# Patient Record
Sex: FEMALE | Race: WHITE | NOT HISPANIC OR LATINO
[De-identification: names, ages, dates, MRNs, and addresses within clinical notes are randomized per-mention and may not be internally consistent; named-entity substitution may affect disease eponyms.]

---

## 2018-04-04 PROBLEM — Z00.00 ENCOUNTER FOR PREVENTIVE HEALTH EXAMINATION: Status: ACTIVE | Noted: 2018-04-04

## 2018-04-09 ENCOUNTER — APPOINTMENT (OUTPATIENT)
Dept: VASCULAR SURGERY | Facility: CLINIC | Age: 78
End: 2018-04-09
Payer: MEDICARE

## 2018-04-09 VITALS
SYSTOLIC BLOOD PRESSURE: 183 MMHG | OXYGEN SATURATION: 96 % | DIASTOLIC BLOOD PRESSURE: 100 MMHG | HEIGHT: 66 IN | WEIGHT: 150 LBS | BODY MASS INDEX: 24.11 KG/M2 | HEART RATE: 77 BPM

## 2018-04-09 DIAGNOSIS — Z78.9 OTHER SPECIFIED HEALTH STATUS: ICD-10-CM

## 2018-04-09 DIAGNOSIS — I77.9 DISORDER OF ARTERIES AND ARTERIOLES, UNSPECIFIED: ICD-10-CM

## 2018-04-09 PROCEDURE — 99204 OFFICE O/P NEW MOD 45 MIN: CPT

## 2018-04-20 ENCOUNTER — OUTPATIENT (OUTPATIENT)
Dept: OUTPATIENT SERVICES | Facility: HOSPITAL | Age: 78
LOS: 1 days | End: 2018-04-20
Payer: MEDICARE

## 2018-04-20 VITALS
HEART RATE: 84 BPM | DIASTOLIC BLOOD PRESSURE: 89 MMHG | SYSTOLIC BLOOD PRESSURE: 185 MMHG | OXYGEN SATURATION: 98 % | TEMPERATURE: 98 F | RESPIRATION RATE: 6 BRPM

## 2018-04-20 DIAGNOSIS — Z01.810 ENCOUNTER FOR PREPROCEDURAL CARDIOVASCULAR EXAMINATION: ICD-10-CM

## 2018-04-20 LAB
ALBUMIN SERPL ELPH-MCNC: 4.4 G/DL — SIGNIFICANT CHANGE UP (ref 3.3–5.2)
ALP SERPL-CCNC: 57 U/L — SIGNIFICANT CHANGE UP (ref 40–120)
ALT FLD-CCNC: 18 U/L — SIGNIFICANT CHANGE UP
ANION GAP SERPL CALC-SCNC: 11 MMOL/L — SIGNIFICANT CHANGE UP (ref 5–17)
APTT BLD: 31.9 SEC — SIGNIFICANT CHANGE UP (ref 27.5–37.4)
AST SERPL-CCNC: 25 U/L — SIGNIFICANT CHANGE UP
BILIRUB SERPL-MCNC: 0.3 MG/DL — LOW (ref 0.4–2)
BUN SERPL-MCNC: 27 MG/DL — HIGH (ref 8–20)
CALCIUM SERPL-MCNC: 10 MG/DL — SIGNIFICANT CHANGE UP (ref 8.6–10.2)
CHLORIDE SERPL-SCNC: 104 MMOL/L — SIGNIFICANT CHANGE UP (ref 98–107)
CO2 SERPL-SCNC: 26 MMOL/L — SIGNIFICANT CHANGE UP (ref 22–29)
CREAT SERPL-MCNC: 0.73 MG/DL — SIGNIFICANT CHANGE UP (ref 0.5–1.3)
GLUCOSE SERPL-MCNC: 93 MG/DL — SIGNIFICANT CHANGE UP (ref 70–115)
HCT VFR BLD CALC: 41.3 % — SIGNIFICANT CHANGE UP (ref 37–47)
HGB BLD-MCNC: 13.4 G/DL — SIGNIFICANT CHANGE UP (ref 12–16)
INR BLD: 0.98 RATIO — SIGNIFICANT CHANGE UP (ref 0.88–1.16)
MCHC RBC-ENTMCNC: 30.7 PG — SIGNIFICANT CHANGE UP (ref 27–31)
MCHC RBC-ENTMCNC: 32.4 G/DL — SIGNIFICANT CHANGE UP (ref 32–36)
MCV RBC AUTO: 94.7 FL — SIGNIFICANT CHANGE UP (ref 81–99)
PLATELET # BLD AUTO: 216 K/UL — SIGNIFICANT CHANGE UP (ref 150–400)
POTASSIUM SERPL-MCNC: 4.4 MMOL/L — SIGNIFICANT CHANGE UP (ref 3.5–5.3)
POTASSIUM SERPL-SCNC: 4.4 MMOL/L — SIGNIFICANT CHANGE UP (ref 3.5–5.3)
PROT SERPL-MCNC: 7.5 G/DL — SIGNIFICANT CHANGE UP (ref 6.6–8.7)
PROTHROM AB SERPL-ACNC: 10.8 SEC — SIGNIFICANT CHANGE UP (ref 9.8–12.7)
RBC # BLD: 4.36 M/UL — LOW (ref 4.4–5.2)
RBC # FLD: 13.9 % — SIGNIFICANT CHANGE UP (ref 11–15.6)
SODIUM SERPL-SCNC: 141 MMOL/L — SIGNIFICANT CHANGE UP (ref 135–145)
WBC # BLD: 5.6 K/UL — SIGNIFICANT CHANGE UP (ref 4.8–10.8)
WBC # FLD AUTO: 5.6 K/UL — SIGNIFICANT CHANGE UP (ref 4.8–10.8)

## 2018-04-20 PROCEDURE — 36415 COLL VENOUS BLD VENIPUNCTURE: CPT

## 2018-04-20 PROCEDURE — 85027 COMPLETE CBC AUTOMATED: CPT

## 2018-04-20 PROCEDURE — 80053 COMPREHEN METABOLIC PANEL: CPT

## 2018-04-20 PROCEDURE — 85610 PROTHROMBIN TIME: CPT

## 2018-04-20 PROCEDURE — 93005 ELECTROCARDIOGRAM TRACING: CPT

## 2018-04-20 PROCEDURE — 93010 ELECTROCARDIOGRAM REPORT: CPT

## 2018-04-20 PROCEDURE — G0463: CPT

## 2018-04-20 PROCEDURE — 85730 THROMBOPLASTIN TIME PARTIAL: CPT

## 2018-04-20 NOTE — H&P PST ADULT - HISTORY OF PRESENT ILLNESS
77 year old female who presents for PST prior to right carotid angio with stent. Pmhx HTN, HLD, carotid disease, mitral valve disease, bilateral carotid disease with a strong family hx of CAD. A recent Carotid duplex showed DAVID stenosis 90% So the plan was made to proceed with this procedure. She denies chest pain, shortness of breath.     The patient states she is extremely sensitive to pain medication and anesthesia and she requests to be given less that usual.     Echo 2/18 normal LVH EF 60%

## 2018-04-26 ENCOUNTER — INPATIENT (INPATIENT)
Facility: HOSPITAL | Age: 78
LOS: 1 days | Discharge: HOME | DRG: 35 | End: 2018-04-28
Attending: HOSPITALIST | Admitting: SPECIALIST
Payer: MEDICARE

## 2018-04-26 ENCOUNTER — TRANSCRIPTION ENCOUNTER (OUTPATIENT)
Age: 78
End: 2018-04-26

## 2018-04-26 VITALS
DIASTOLIC BLOOD PRESSURE: 76 MMHG | TEMPERATURE: 98 F | SYSTOLIC BLOOD PRESSURE: 184 MMHG | HEART RATE: 76 BPM | RESPIRATION RATE: 16 BRPM | OXYGEN SATURATION: 98 %

## 2018-04-26 DIAGNOSIS — Z01.810 ENCOUNTER FOR PREPROCEDURAL CARDIOVASCULAR EXAMINATION: ICD-10-CM

## 2018-04-26 DIAGNOSIS — I05.9 RHEUMATIC MITRAL VALVE DISEASE, UNSPECIFIED: ICD-10-CM

## 2018-04-26 DIAGNOSIS — I10 ESSENTIAL (PRIMARY) HYPERTENSION: ICD-10-CM

## 2018-04-26 DIAGNOSIS — I73.9 PERIPHERAL VASCULAR DISEASE, UNSPECIFIED: ICD-10-CM

## 2018-04-26 DIAGNOSIS — I65.23 OCCLUSION AND STENOSIS OF BILATERAL CAROTID ARTERIES: ICD-10-CM

## 2018-04-26 DIAGNOSIS — I65.21 OCCLUSION AND STENOSIS OF RIGHT CAROTID ARTERY: ICD-10-CM

## 2018-04-26 DIAGNOSIS — E78.4 OTHER HYPERLIPIDEMIA: ICD-10-CM

## 2018-04-26 LAB
BLD GP AB SCN SERPL QL: SIGNIFICANT CHANGE UP
TYPE + AB SCN PNL BLD: SIGNIFICANT CHANGE UP

## 2018-04-26 RX ORDER — ASPIRIN/CALCIUM CARB/MAGNESIUM 324 MG
243 TABLET ORAL ONCE
Qty: 0 | Refills: 0 | Status: COMPLETED | OUTPATIENT
Start: 2018-04-26 | End: 2018-04-26

## 2018-04-26 RX ORDER — ASCORBIC ACID 60 MG
0 TABLET,CHEWABLE ORAL
Qty: 0 | Refills: 0 | COMMUNITY

## 2018-04-26 RX ORDER — PHENYLEPHRINE HYDROCHLORIDE 10 MG/ML
0.59 INJECTION INTRAVENOUS
Qty: 80 | Refills: 0 | Status: DISCONTINUED | OUTPATIENT
Start: 2018-04-26 | End: 2018-04-27

## 2018-04-26 RX ORDER — CLOPIDOGREL BISULFATE 75 MG/1
1 TABLET, FILM COATED ORAL
Qty: 0 | Refills: 0 | COMMUNITY

## 2018-04-26 RX ORDER — ASPIRIN/CALCIUM CARB/MAGNESIUM 324 MG
1 TABLET ORAL
Qty: 0 | Refills: 0 | COMMUNITY

## 2018-04-26 RX ORDER — ROSUVASTATIN CALCIUM 5 MG/1
1 TABLET ORAL
Qty: 0 | Refills: 0 | COMMUNITY

## 2018-04-26 RX ORDER — ATORVASTATIN CALCIUM 80 MG/1
80 TABLET, FILM COATED ORAL AT BEDTIME
Qty: 0 | Refills: 0 | Status: DISCONTINUED | OUTPATIENT
Start: 2018-04-26 | End: 2018-04-28

## 2018-04-26 RX ORDER — OMEGA-3 ACID ETHYL ESTERS 1 G
0 CAPSULE ORAL
Qty: 0 | Refills: 0 | COMMUNITY

## 2018-04-26 RX ORDER — SODIUM CHLORIDE 9 MG/ML
1000 INJECTION INTRAMUSCULAR; INTRAVENOUS; SUBCUTANEOUS
Qty: 0 | Refills: 0 | Status: DISCONTINUED | OUTPATIENT
Start: 2018-04-26 | End: 2018-04-27

## 2018-04-26 RX ORDER — ASPIRIN/CALCIUM CARB/MAGNESIUM 324 MG
325 TABLET ORAL DAILY
Qty: 0 | Refills: 0 | Status: DISCONTINUED | OUTPATIENT
Start: 2018-04-27 | End: 2018-04-28

## 2018-04-26 RX ORDER — PANTOPRAZOLE SODIUM 20 MG/1
40 TABLET, DELAYED RELEASE ORAL
Qty: 0 | Refills: 0 | Status: DISCONTINUED | OUTPATIENT
Start: 2018-04-27 | End: 2018-04-28

## 2018-04-26 RX ORDER — ASPIRIN/CALCIUM CARB/MAGNESIUM 324 MG
81 TABLET ORAL ONCE
Qty: 0 | Refills: 0 | Status: COMPLETED | OUTPATIENT
Start: 2018-04-26 | End: 2018-04-26

## 2018-04-26 RX ORDER — CLOPIDOGREL BISULFATE 75 MG/1
75 TABLET, FILM COATED ORAL DAILY
Qty: 0 | Refills: 0 | Status: DISCONTINUED | OUTPATIENT
Start: 2018-04-27 | End: 2018-04-28

## 2018-04-26 RX ORDER — CLOPIDOGREL BISULFATE 75 MG/1
75 TABLET, FILM COATED ORAL ONCE
Qty: 0 | Refills: 0 | Status: COMPLETED | OUTPATIENT
Start: 2018-04-26 | End: 2018-04-26

## 2018-04-26 RX ORDER — UBIDECARENONE 100 MG
1 CAPSULE ORAL
Qty: 0 | Refills: 0 | COMMUNITY

## 2018-04-26 RX ORDER — CHLORHEXIDINE GLUCONATE 213 G/1000ML
1 SOLUTION TOPICAL ONCE
Qty: 0 | Refills: 0 | Status: DISCONTINUED | OUTPATIENT
Start: 2018-04-26 | End: 2018-04-27

## 2018-04-26 RX ADMIN — ATORVASTATIN CALCIUM 80 MILLIGRAM(S): 80 TABLET, FILM COATED ORAL at 22:21

## 2018-04-26 RX ADMIN — CLOPIDOGREL BISULFATE 75 MILLIGRAM(S): 75 TABLET, FILM COATED ORAL at 11:45

## 2018-04-26 RX ADMIN — Medication 81 MILLIGRAM(S): at 11:38

## 2018-04-26 RX ADMIN — SODIUM CHLORIDE 75 MILLILITER(S): 9 INJECTION INTRAMUSCULAR; INTRAVENOUS; SUBCUTANEOUS at 11:37

## 2018-04-26 RX ADMIN — Medication 243 MILLIGRAM(S): at 11:45

## 2018-04-26 NOTE — DISCHARGE NOTE ADULT - MEDICATION SUMMARY - MEDICATIONS TO TAKE
I will START or STAY ON the medications listed below when I get home from the hospital:    aspirin 81 mg oral tablet  -- 1 tab(s) by mouth once a day  -- Indication: For Carotid stenosis, right    rosuvastatin 40 mg oral tablet  -- 1 tab(s) by mouth once a day (at bedtime)  -- Indication: For Hyperlipidemia    Plavix 75 mg oral tablet  -- 1 tab(s) by mouth once a day  -- Indication: For Carotid stenosis, right    Co Q-10 100 mg oral capsule  -- 1 cap(s) by mouth once a day  -- Indication: For Supplement     Fish Oil oral capsule  -- Indication: For Other hyperlipidemia    Vitamin C  -- Indication: For Supplement

## 2018-04-26 NOTE — DISCHARGE NOTE ADULT - HOSPITAL COURSE
76 y/o female with a history of HTN, hyperlipidemia, mitral valve disease, and bilateral carotid stenosis s/p placement of right internal carotid artery stent after a recent carotid duplex showed 90% occlusion of the DAVID. Procedure was performed without complications and patient's BP maintained on neosynephrine infusion.    ·  Problem: Carotid stenosis, right.  Continue aspirin 325 mg daily / plavix 75  mg daily and statin    Hypertension: Hold antihypertensive medications for now for prevailing low normal BP    Encourgage oral hydration w/IVF and OOB    Outpatient follow-up with Dr Gracia @ [facility] in one week.

## 2018-04-26 NOTE — DISCHARGE NOTE ADULT - CARE PROVIDER_API CALL
Jorge Gracia), Cardiovascular Disease; Internal Medicine; Interventional Cardiology  [address] [facility]  [address] [place] [address]  Phone: [phone]  Fax: [phone]

## 2018-04-26 NOTE — DISCHARGE NOTE ADULT - CARE PLAN
Principal Discharge DX:	Carotid stenosis, right  Goal:	Carotid stent remain patent  Assessment and plan of treatment:	No heavy lifting, driving, sex, tub baths, swimming, or any activity that submerges the lower half of the body in water for 48 hours.  Limited walking and stairs for 48 hours.    Change the bandaid after 24 hours and every 24 hours after that.  Keep the puncture site dry and covered with a bandaid until a scab forms.    Observe the site frequently.  If bleeding or a large lump (the size of a golf ball or bigger) occurs lie flat, apply continuous direct pressure just above the puncture site for at least 10 minutes, and notify your physician immediately.  If the bleeding cannot be controlled, call 911 immediately for assistance.  Notify your physician of pain, swelling or any drainage.    Notify your physician immediately if coldness, numbness, discoloration or pain in your foot occurs.  Follow up with Dr. Gracia in one to two weeks. Principal Discharge DX:	Carotid stenosis, right  Goal:	Carotid stent remain patent  Assessment and plan of treatment:	No heavy lifting, driving, sex, tub baths, swimming, or any activity that submerges the lower half of the body in water for 48 hours.  Limited walking and stairs for 48 hours.    Change the bandaid after 24 hours and every 24 hours after that.  Keep the puncture site dry and covered with a bandaid until a scab forms.    Observe the site frequently.  If bleeding or a large lump (the size of a golf ball or bigger) occurs lie flat, apply continuous direct pressure just above the puncture site for at least 10 minutes, and notify your physician immediately.  If the bleeding cannot be controlled, call 911 immediately for assistance.  Notify your physician of pain, swelling or any drainage.    Notify your physician immediately if coldness, numbness, discoloration or pain in your foot occurs.  Follow up with Dr. Gracia in one to two weeks.  Secondary Diagnosis:	Mixed hyperlipidemia  Secondary Diagnosis:	Essential hypertension  Secondary Diagnosis:	Mitral valve disease  Secondary Diagnosis:	Peripheral vascular disease

## 2018-04-26 NOTE — CONSULT NOTE ADULT - SUBJECTIVE AND OBJECTIVE BOX
Patient is a 77y old  Female who presents with a chief complaint of carotid stenosis (26 Apr 2018 16:02)      BRIEF HOSPITAL COURSE: 78 y/o female with a history of HTN, hyperlipidemia, mitral valve disease, and bilateral carotid stenosis s/p placement of right internal carotid artery stent after a recent carotid duplex showed 90% occlusion of the DAVID. Procedure was performed without complications and patient's BP maintained on neosynephrine infusion.      PAST MEDICAL & SURGICAL HISTORY:  Mitral valve disease  Hyperlipidemia  Peripheral vascular disease  Hypertension  No significant past surgical history    Allergies    penicillin (Hives)    Intolerances      FAMILY HISTORY:  No pertinent family history in first degree relatives      Review of Systems:  CONSTITUTIONAL: No fever, chills, or fatigue  EYES: No eye pain, visual disturbances, or discharge  ENMT:  No difficulty hearing, tinnitus, vertigo; No sinus or throat pain  NECK: No pain or stiffness  RESPIRATORY: No cough, wheezing, chills or hemoptysis; No shortness of breath  CARDIOVASCULAR: No chest pain, palpitations, dizziness, or leg swelling  GASTROINTESTINAL: No abdominal or epigastric pain. No nausea, vomiting, or hematemesis; No diarrhea or constipation. No melena or hematochezia.  GENITOURINARY: No dysuria, frequency, hematuria, or incontinence  NEUROLOGICAL: No headaches, memory loss, loss of strength, numbness, or tremors  SKIN: No hematoma present. No itching, burning, rashes, or lesions   MUSCULOSKELETAL: No joint pain or swelling; No muscle, back, or extremity pain  PSYCHIATRIC: No depression, anxiety, mood swings, or difficulty sleeping      Medications:    phenylephrine    Infusion 0.588 MICROgram(s)/kG/Min IV Continuous <Continuous>                atorvastatin 80 milliGRAM(s) Oral at bedtime    sodium chloride 0.9%. 1000 milliLiter(s) IV Continuous <Continuous>      chlorhexidine 4% Liquid 1 Application(s) Topical once            ICU Vital Signs Last 24 Hrs  T(C): 36.8 (26 Apr 2018 18:45), Max: 36.8 (26 Apr 2018 18:45)  T(F): 98.2 (26 Apr 2018 18:45), Max: 98.2 (26 Apr 2018 18:45)  HR: 61 (26 Apr 2018 18:45) (51 - 80)  BP: 109/59 (26 Apr 2018 18:45) (102/63 - 184/76)  BP(mean): 81 (26 Apr 2018 18:45) (81 - 81)  ABP: --  ABP(mean): --  RR: 12 (26 Apr 2018 18:45) (12 - 18)  SpO2: 98% (26 Apr 2018 18:45) (97% - 100%)    Vital Signs Last 24 Hrs  T(C): 36.8 (26 Apr 2018 18:45), Max: 36.8 (26 Apr 2018 18:45)  T(F): 98.2 (26 Apr 2018 18:45), Max: 98.2 (26 Apr 2018 18:45)  HR: 61 (26 Apr 2018 18:45) (51 - 80)  BP: 109/59 (26 Apr 2018 18:45) (102/63 - 184/76)  BP(mean): 81 (26 Apr 2018 18:45) (81 - 81)  RR: 12 (26 Apr 2018 18:45) (12 - 18)  SpO2: 98% (26 Apr 2018 18:45) (97% - 100%)        I&O's Detail        LABS:                CAPILLARY BLOOD GLUCOSE            CULTURES:      Physical Examination:    General: No acute distress.      HEENT: Pupils equal, reactive to light.  Symmetric.    PULM: Clear to auscultation bilaterally, no significant sputum production    NECK: Supple, no lymphadenopathy, trachea midline    CVS: Pulses present bilaterally. Regular rate and rhythm, no murmurs, rubs, or gallops    ABD: Soft, nondistended, nontender, normoactive bowel sounds, no masses    EXT: No edema, nontender    SKIN: Warm and well perfused, no rashes noted.    NEURO: Alert, oriented, interactive, nonfocal    DEVICES:     RADIOLOGY: ***    CRITICAL CARE TIME SPENT: ***

## 2018-04-26 NOTE — CONSULT NOTE ADULT - PROBLEM SELECTOR RECOMMENDATION 9
s/p placement of DAVID stent. As per cardio maintain -140 with neosynephrine. Titrate neosynephrine down as BP allows within target range. Bedrest while on neosynephrine infusion. PPI for GI stress prophylaxis. s/p placement of DAVID stent. Admit to ICU and maintain -140 with neosynephrine as per referring provider. Titrate neosynephrine down as BP allows within target range. Bedrest while on neosynephrine infusion. PPI for GI stress prophylaxis.

## 2018-04-26 NOTE — DISCHARGE NOTE ADULT - PLAN OF CARE
Carotid stent remain patent No heavy lifting, driving, sex, tub baths, swimming, or any activity that submerges the lower half of the body in water for 48 hours.  Limited walking and stairs for 48 hours.    Change the bandaid after 24 hours and every 24 hours after that.  Keep the puncture site dry and covered with a bandaid until a scab forms.    Observe the site frequently.  If bleeding or a large lump (the size of a golf ball or bigger) occurs lie flat, apply continuous direct pressure just above the puncture site for at least 10 minutes, and notify your physician immediately.  If the bleeding cannot be controlled, call 911 immediately for assistance.  Notify your physician of pain, swelling or any drainage.    Notify your physician immediately if coldness, numbness, discoloration or pain in your foot occurs.  Follow up with Dr. Gracia in one to two weeks.

## 2018-04-26 NOTE — PROGRESS NOTE ADULT - SUBJECTIVE AND OBJECTIVE BOX
Cardiology NP note:     -s/p DAVID stent therapy while using distal embolic protection  -RFA angioseal and RFV site benign without hematoma/bleeding; site benign without hematoma/bleeding; no bruit; + right PP  -VSS--on Neosynephrine infusion    -A/P: 77 year old female with Pmhx HTN, HLD, mitral valve disease, bilateral carotid disease with a strong family hx of CAD. A recent Carotid duplex showed DAVID stenosis 90%.  Patient now s/p DAVID stent therapy while using distal embolic protection without complication. Cardiology NP note:     -s/p DAVID stent therapy while using distal embolic protection  -RFA angioseal and RFV site benign without hematoma/bleeding; site benign without hematoma/bleeding; no bruit; + right PP  -VSS--on Neosynephrine infusion    -A/P: 77 year old female with Pmhx HTN, HLD, mitral valve disease, bilateral carotid disease with a strong family hx of CAD. A recent Carotid duplex showed DAVID stenosis 90%.  Patient now s/p DAVID stent therapy while using distal embolic protection without complication.  -Admit to ICU secondary to need for Neosynephrine infusion to maintain -140 mmhg post procedure  -Plan to titrate neosynephrine infusion to off  -If stable, hopeful discharge home tomorrow  -Meds: Increase ASA to 325mg PO daily and Add Plavix daily  -PPI for GI prohylaxis  -Hold antihypertensives for now  -Bedrest while Neosynephrine infusing then as per ICU staff  -Benefits of ASA/Plavix emphasized with patient verbal understanding  -Lifestyle mods/diet/Activity/meds discussed with patient verbal understanding  -Report given to ICU PA  -Discussed with Dr. Gracia Cardiology NP note:     -s/p DAVID stent therapy while using distal embolic protection  -RFA angioseal and RFV site benign without hematoma/bleeding; site benign without hematoma/bleeding; no bruit; + right PP  -VSS--on Neosynephrine infusion    -A/P: 77 year old female with Pmhx HTN, HLD, mitral valve disease, bilateral carotid disease with a strong family hx of CAD. A recent Carotid duplex showed DAVID stenosis 90%.  Patient now s/p DAVID stent therapy while using distal embolic protection without complication.  -Admit to ICU secondary to need for Neosynephrine infusion to maintain -140 mmhg post procedure  -Plan to titrate neosynephrine infusion to off; if unable to titrate neosynephrine off by am may give midodrine 5mg PO x 1 to help maintain BP as per Dr. Gracia  -If stable, hopeful discharge home tomorrow  -Meds: Increase ASA to 325mg PO daily and Add Plavix daily  -PPI for GI prohylaxis  -Hold antihypertensives for now  -Bedrest while Neosynephrine infusing then as per ICU staff  -Benefits of ASA/Plavix emphasized with patient verbal understanding  -Lifestyle mods/diet/Activity/meds discussed with patient verbal understanding  -Report given to ICU PA  -Discussed with Dr. Gracia

## 2018-04-26 NOTE — DISCHARGE NOTE ADULT - PATIENT PORTAL LINK FT
You can access the [facility] Patient Portal, offered by [facility], by registering with the following website: [email or web address]

## 2018-04-26 NOTE — CONSULT NOTE ADULT - ASSESSMENT
78 y/o female with a history of HTN, HLD, mitral valve disease, bilateral carotid artery stenosis, and a family history of CAD s/p placement of DAVID without complications. 76 y/o female with a history of HTN, HLD, mitral valve disease, bilateral carotid artery stenosis, and a family history of CAD s/p placement of DAVID without complications. Admitted to MICU for BP maintenance with neosynephrine 130-140.

## 2018-04-27 DIAGNOSIS — I10 ESSENTIAL (PRIMARY) HYPERTENSION: ICD-10-CM

## 2018-04-27 DIAGNOSIS — R57.9 SHOCK, UNSPECIFIED: ICD-10-CM

## 2018-04-27 DIAGNOSIS — E78.5 HYPERLIPIDEMIA, UNSPECIFIED: ICD-10-CM

## 2018-04-27 LAB
ANION GAP SERPL CALC-SCNC: 14 MMOL/L — SIGNIFICANT CHANGE UP (ref 5–17)
BASOPHILS # BLD AUTO: 0 K/UL — SIGNIFICANT CHANGE UP (ref 0–0.2)
BASOPHILS NFR BLD AUTO: 0.3 % — SIGNIFICANT CHANGE UP (ref 0–2)
BUN SERPL-MCNC: 19 MG/DL — SIGNIFICANT CHANGE UP (ref 8–20)
CALCIUM SERPL-MCNC: 9 MG/DL — SIGNIFICANT CHANGE UP (ref 8.6–10.2)
CHLORIDE SERPL-SCNC: 106 MMOL/L — SIGNIFICANT CHANGE UP (ref 98–107)
CO2 SERPL-SCNC: 20 MMOL/L — LOW (ref 22–29)
CREAT SERPL-MCNC: 0.69 MG/DL — SIGNIFICANT CHANGE UP (ref 0.5–1.3)
EOSINOPHIL # BLD AUTO: 0.1 K/UL — SIGNIFICANT CHANGE UP (ref 0–0.5)
EOSINOPHIL NFR BLD AUTO: 0.8 % — SIGNIFICANT CHANGE UP (ref 0–6)
GLUCOSE SERPL-MCNC: 102 MG/DL — SIGNIFICANT CHANGE UP (ref 70–115)
HCT VFR BLD CALC: 37.8 % — SIGNIFICANT CHANGE UP (ref 37–47)
HGB BLD-MCNC: 12.1 G/DL — SIGNIFICANT CHANGE UP (ref 12–16)
LYMPHOCYTES # BLD AUTO: 1.4 K/UL — SIGNIFICANT CHANGE UP (ref 1–4.8)
LYMPHOCYTES # BLD AUTO: 13.6 % — LOW (ref 20–55)
MCHC RBC-ENTMCNC: 30 PG — SIGNIFICANT CHANGE UP (ref 27–31)
MCHC RBC-ENTMCNC: 32 G/DL — SIGNIFICANT CHANGE UP (ref 32–36)
MCV RBC AUTO: 93.6 FL — SIGNIFICANT CHANGE UP (ref 81–99)
MONOCYTES # BLD AUTO: 1.1 K/UL — HIGH (ref 0–0.8)
MONOCYTES NFR BLD AUTO: 11.1 % — HIGH (ref 3–10)
NEUTROPHILS # BLD AUTO: 7.6 K/UL — SIGNIFICANT CHANGE UP (ref 1.8–8)
NEUTROPHILS NFR BLD AUTO: 74 % — HIGH (ref 37–73)
PLATELET # BLD AUTO: 220 K/UL — SIGNIFICANT CHANGE UP (ref 150–400)
POTASSIUM SERPL-MCNC: 4.3 MMOL/L — SIGNIFICANT CHANGE UP (ref 3.5–5.3)
POTASSIUM SERPL-SCNC: 4.3 MMOL/L — SIGNIFICANT CHANGE UP (ref 3.5–5.3)
RBC # BLD: 4.04 M/UL — LOW (ref 4.4–5.2)
RBC # FLD: 14.2 % — SIGNIFICANT CHANGE UP (ref 11–15.6)
SODIUM SERPL-SCNC: 140 MMOL/L — SIGNIFICANT CHANGE UP (ref 135–145)
WBC # BLD: 10.3 K/UL — SIGNIFICANT CHANGE UP (ref 4.8–10.8)
WBC # FLD AUTO: 10.3 K/UL — SIGNIFICANT CHANGE UP (ref 4.8–10.8)

## 2018-04-27 PROCEDURE — 99233 SBSQ HOSP IP/OBS HIGH 50: CPT

## 2018-04-27 RX ORDER — SODIUM CHLORIDE 9 MG/ML
500 INJECTION, SOLUTION INTRAVENOUS ONCE
Qty: 0 | Refills: 0 | Status: COMPLETED | OUTPATIENT
Start: 2018-04-27 | End: 2018-04-27

## 2018-04-27 RX ORDER — MIDODRINE HYDROCHLORIDE 2.5 MG/1
10 TABLET ORAL EVERY 8 HOURS
Qty: 0 | Refills: 0 | Status: DISCONTINUED | OUTPATIENT
Start: 2018-04-27 | End: 2018-04-27

## 2018-04-27 RX ORDER — CALCIUM CHLORIDE
1000 POWDER (GRAM) MISCELLANEOUS ONCE
Qty: 0 | Refills: 0 | Status: COMPLETED | OUTPATIENT
Start: 2018-04-27 | End: 2018-04-27

## 2018-04-27 RX ORDER — SODIUM CHLORIDE 9 MG/ML
1000 INJECTION, SOLUTION INTRAVENOUS
Qty: 0 | Refills: 0 | Status: DISCONTINUED | OUTPATIENT
Start: 2018-04-27 | End: 2018-04-27

## 2018-04-27 RX ORDER — SODIUM CHLORIDE 9 MG/ML
1000 INJECTION, SOLUTION INTRAVENOUS
Qty: 0 | Refills: 0 | Status: DISCONTINUED | OUTPATIENT
Start: 2018-04-27 | End: 2018-04-28

## 2018-04-27 RX ORDER — SODIUM CHLORIDE 9 MG/ML
1000 INJECTION, SOLUTION INTRAVENOUS ONCE
Qty: 0 | Refills: 0 | Status: COMPLETED | OUTPATIENT
Start: 2018-04-27 | End: 2018-04-27

## 2018-04-27 RX ADMIN — Medication 30 MILLILITER(S): at 01:47

## 2018-04-27 RX ADMIN — SODIUM CHLORIDE 2000 MILLILITER(S): 9 INJECTION, SOLUTION INTRAVENOUS at 09:22

## 2018-04-27 RX ADMIN — SODIUM CHLORIDE 125 MILLILITER(S): 9 INJECTION, SOLUTION INTRAVENOUS at 11:41

## 2018-04-27 RX ADMIN — Medication 30 MILLILITER(S): at 06:50

## 2018-04-27 RX ADMIN — MIDODRINE HYDROCHLORIDE 10 MILLIGRAM(S): 2.5 TABLET ORAL at 06:43

## 2018-04-27 RX ADMIN — CLOPIDOGREL BISULFATE 75 MILLIGRAM(S): 75 TABLET, FILM COATED ORAL at 11:40

## 2018-04-27 RX ADMIN — PANTOPRAZOLE SODIUM 40 MILLIGRAM(S): 20 TABLET, DELAYED RELEASE ORAL at 06:50

## 2018-04-27 RX ADMIN — Medication 1000 MILLIGRAM(S): at 10:10

## 2018-04-27 RX ADMIN — Medication 325 MILLIGRAM(S): at 11:40

## 2018-04-27 RX ADMIN — MIDODRINE HYDROCHLORIDE 10 MILLIGRAM(S): 2.5 TABLET ORAL at 13:46

## 2018-04-27 RX ADMIN — SODIUM CHLORIDE 500 MILLILITER(S): 9 INJECTION, SOLUTION INTRAVENOUS at 12:43

## 2018-04-27 RX ADMIN — ATORVASTATIN CALCIUM 80 MILLIGRAM(S): 80 TABLET, FILM COATED ORAL at 21:56

## 2018-04-27 RX ADMIN — SODIUM CHLORIDE 2000 MILLILITER(S): 9 INJECTION, SOLUTION INTRAVENOUS at 10:40

## 2018-04-27 NOTE — PROGRESS NOTE ADULT - SUBJECTIVE AND OBJECTIVE BOX
s/p carotid angiogram via RFA  INTERVENTIONAL IMPRESSIONS: Severe (80%) ostial DAVID and moderate (50%)  ostial LCIA stenoses  Successful self-expanding stent therapy of an 80% ostial DAVID stenosis  using distal embolic protection  All distal intracerebral branches were preserved post-intervention. There  were no apparent neurologic complications of the procedure.  The patient required low dose neosynephrine for BP support post-procedure.  Overnight required neosynephrine support that has been discontinued in past 6 hours along with aggressive fluid resuscitation.  Tele revealed SB with accelerated rhythm No ventricular ectopy.  HR low 39          REVIEW OF SYSTEMS:  Denies SOB, CP, NV, HA, dizziness, palpitations, site pain    PHYSICAL EXAM: A&Ox3 NAD Skin warm and dry  NEURO: Speech intact +gag +swallow Tongue midline DONAHUE  NECK: No JVD, trachea midline. Eupneic  HEART: RRR S1S2 no g/m  PULMONARY:  CTA pretty  ABDOMEN: Soft nontender X4 +BS Vdg/eating  EXTREMITIES: RFA site: No bleed, hematoma, pain, small and soft ecchymosis at site or swelling Rt DP/PT+ new DSD applied.    A- s/p carotid angiogram with stent to DAVID 80% lesion RFA      P- INTERVENTIONAL RECOMMENDATIONS: Continue aspirin 325 mg daily / plavix 75  mg daily and statin  Hold antihypertensive medications for now  Encourgage oral hydration w/IVF and OOB  12 lead ECG in AM  Dr. Marley to manage the patient on 4T  Anticipate d/c in AM if hemodynamically stable  Outpatient follow-up with Dr Gracia @ [facility] in one week

## 2018-04-27 NOTE — PROGRESS NOTE ADULT - SUBJECTIVE AND OBJECTIVE BOX
[facility], [facility]                                                    [address], [address], [place] [address]                                                         PHONE: [phone]    FAX: [phone] [address], [address], [place] [address]                                                 PHONE: [phone]    FAX: [phone]  *******************************************************************************  cc: s/p right carotid stent    HPI:  78 y/o female with a history of HTN, hyperlipidemia, mitral valve disease, and bilateral carotid stenosis s/p placement of right internal carotid artery stent due to critical asymptomatic 90% occlusion of the R ICA. No CP, SOB. PND or orthopnea.    Overnight events/Subjective Assessment: requiring vasopressor support. Bradycardia      penicillin (Hives)    MEDICATIONS  (STANDING):  aspirin 325 milliGRAM(s) Oral daily  atorvastatin 80 milliGRAM(s) Oral at bedtime  chlorhexidine 4% Liquid 1 Application(s) Topical once  clopidogrel Tablet 75 milliGRAM(s) Oral daily  midodrine 10 milliGRAM(s) Oral every 8 hours  pantoprazole    Tablet 40 milliGRAM(s) Oral before breakfast  phenylephrine    Infusion 0.588 MICROgram(s)/kG/Min (15 mL/Hr) IV Continuous <Continuous>  sodium chloride 0.9%. 1000 milliLiter(s) (75 mL/Hr) IV Continuous <Continuous>    MEDICATIONS  (PRN):  aluminum hydroxide/magnesium hydroxide/simethicone Suspension 30 milliLiter(s) Oral every 4 hours PRN Dyspepsia      Vital Signs Last 24 Hrs  T(C): 37.3 (26 Apr 2018 23:57), Max: 37.3 (26 Apr 2018 23:57)  T(F): 99.2 (26 Apr 2018 23:57), Max: 99.2 (26 Apr 2018 23:57)  HR: 49 (26 Apr 2018 21:15) (43 - 80)  BP: 144/63 (26 Apr 2018 21:15) (79/45 - 184/76)  BP(mean): 90 (26 Apr 2018 21:15) (57 - 91)  RR: 18 (26 Apr 2018 21:15) (12 - 29)  SpO2: 98% (26 Apr 2018 21:15) (97% - 100%)    I&O's Detail    26 Apr 2018 07:01  -  27 Apr 2018 06:01  --------------------------------------------------------  IN:    Oral Fluid: 260 mL    phenylephrine   Infusion: 137 mL    sodium chloride 0.9%.: 240 mL  Total IN: 637 mL    OUT:    Voided: 400 mL  Total OUT: 400 mL    Total NET: 237 mL        I&O's Summary    26 Apr 2018 07:01  -  27 Apr 2018 06:01  --------------------------------------------------------  IN: 637 mL / OUT: 400 mL / NET: 237 mL            PHYSICAL EXAM:  General: Appears well developed, well nourished, no acute distress  HEENT: Head: normocephalic, atraumatic  Eyes: Pupils equal and reactive  Neck: Supple, no carotid bruit, no JVD, no HJR  CARDIOVASCULAR: Normal S1 and S2, no murmur, rub, or gallop  LUNGS: Clear to auscultation bilaterally, no rales, rhonchi or wheeze  ABDOMEN: Soft, nontender, non-distended, positive bowel sounds, no mass or bruit  EXTREMITIES: No edema, distal pulses WNL  SKIN: Warm and dry with normal turgor  NEURO: Alert & oriented x 3, grossly intact  PSYCH: normal mood and affect        LABS:                        12.1   10.3  )-----------( 220      ( 27 Apr 2018 05:50 )             37.8                 serum  Lipids:         RADIOLOGY & ADDITIONAL STUDIES:        CARDIAC CATHETERIZATION: < from: Cardiac Cath Lab - Adult (04.26.18 @ 12:01) >  DIAGNOSTIC IMPRESSIONS: Severe (80%) ostial DAVID andmoderate (50%) ostial  LCIA stenoses  Successful self-expanding stent therapy of an 80% ostial DAVID stenosis  using distal embolic protection  All distal intracerebral branches were preserved post-intervention. There  were no apparent neurologic complications of the procedure.    < end of copied text >      ASSESSMENT AND PLAN:  In summary, YANI GUY is a 78 y/o female with a history of HTN, hyperlipidemia, mitral valve disease, and bilateral carotid stenosis s/p placement of right internal carotid artery stent due to critical asymptomatic 90% occlusion of the R ICA. No CP, SOB. PND or orthopnea.    - maintain ASA and plavix  - atorvastatin  - wean off phenynephrine. Received midodrine, BP is improved  - hemodynamically stable sinus sanjeev likely vagally mediated  - Likely home later today  - Outpt follow up with Dr Gracia this Tuesday arranged.        Kenia Hu MD [facility], [facility]                                                    [address], [place] [address]                                                         PHONE: [phone]    FAX: [phone] [address], [address], [place] [address]                                                 PHONE: [phone]    FAX: [phone]  *******************************************************************************  cc: s/p right carotid stent    HPI:  78 y/o female with a history of HTN, hyperlipidemia, mitral valve disease, and bilateral carotid stenosis s/p placement of right internal carotid artery stent due to critical asymptomatic 90% occlusion of the R ICA. No CP, SOB. PND or orthopnea.    Overnight events/Subjective Assessment: requiring vasopressor support. Sinus Bradycardia. Denies symptoms. Anxious for DC      penicillin (Hives)    MEDICATIONS  (STANDING):  aspirin 325 milliGRAM(s) Oral daily  atorvastatin 80 milliGRAM(s) Oral at bedtime  chlorhexidine 4% Liquid 1 Application(s) Topical once  clopidogrel Tablet 75 milliGRAM(s) Oral daily  midodrine 10 milliGRAM(s) Oral every 8 hours  pantoprazole    Tablet 40 milliGRAM(s) Oral before breakfast  phenylephrine    Infusion 0.588 MICROgram(s)/kG/Min (15 mL/Hr) IV Continuous <Continuous>  sodium chloride 0.9%. 1000 milliLiter(s) (75 mL/Hr) IV Continuous <Continuous>    MEDICATIONS  (PRN):  aluminum hydroxide/magnesium hydroxide/simethicone Suspension 30 milliLiter(s) Oral every 4 hours PRN Dyspepsia      Vital Signs Last 24 Hrs  T(C): 37.3 (26 Apr 2018 23:57), Max: 37.3 (26 Apr 2018 23:57)  T(F): 99.2 (26 Apr 2018 23:57), Max: 99.2 (26 Apr 2018 23:57)  HR: 49 (26 Apr 2018 21:15) (43 - 80)  BP: 144/63 (26 Apr 2018 21:15) (79/45 - 184/76)  BP(mean): 90 (26 Apr 2018 21:15) (57 - 91)  RR: 18 (26 Apr 2018 21:15) (12 - 29)  SpO2: 98% (26 Apr 2018 21:15) (97% - 100%)    I&O's Detail    26 Apr 2018 07:01  -  27 Apr 2018 06:01  --------------------------------------------------------  IN:    Oral Fluid: 260 mL    phenylephrine   Infusion: 137 mL    sodium chloride 0.9%.: 240 mL  Total IN: 637 mL    OUT:    Voided: 400 mL  Total OUT: 400 mL    Total NET: 237 mL        I&O's Summary    26 Apr 2018 07:01  -  27 Apr 2018 06:01  --------------------------------------------------------  IN: 637 mL / OUT: 400 mL / NET: 237 mL            PHYSICAL EXAM:  General: Appears well developed, well nourished, no acute distress  HEENT: Head: normocephalic, atraumatic  Eyes: Pupils equal and reactive  Neck: Supple, no carotid bruit, no JVD, no HJR  CARDIOVASCULAR: Normal S1 and S2, no murmur, rub, or gallop  LUNGS: Clear to auscultation bilaterally, no rales, rhonchi or wheeze  ABDOMEN: Soft, nontender, non-distended, positive bowel sounds, no mass or bruit  EXTREMITIES: No edema, distal pulses WNL  SKIN: Warm and dry with normal turgor  NEURO: Alert & oriented x 3, grossly intact  PSYCH: normal mood and affect        LABS:                        12.1   10.3  )-----------( 220      ( 27 Apr 2018 05:50 )             37.8                 serum  Lipids:         RADIOLOGY & ADDITIONAL STUDIES:        CARDIAC CATHETERIZATION: < from: Cardiac Cath Lab - Adult (04.26.18 @ 12:01) >  DIAGNOSTIC IMPRESSIONS: Severe (80%) ostial DAVID andmoderate (50%) ostial  LCIA stenoses  Successful self-expanding stent therapy of an 80% ostial DAVID stenosis  using distal embolic protection  All distal intracerebral branches were preserved post-intervention. There  were no apparent neurologic complications of the procedure.    < end of copied text >      ASSESSMENT AND PLAN:  In summary, YANI GUY is a 78 y/o female with a history of HTN, hyperlipidemia, mitral valve disease, and bilateral carotid stenosis s/p placement of right internal carotid artery stent due to critical asymptomatic 90% occlusion of the R ICA. No CP, SOB. PND or orthopnea.    - maintain ASA and plavix  - atorvastatin  - wean off phenynephrine. Received midodrine, BP is improving and likely vagally driven  - hemodynamically stable sinus sanjeev asymptomatic.  - Likely home later today once off of pressors.  - Outpt follow up with Dr Gracia this Tuesday arranged.        Kenia Hu MD

## 2018-04-27 NOTE — PROGRESS NOTE ADULT - ASSESSMENT
77 yr female s/p Right carotid stent placement for 90% occlusion      Problem/Plan - 1:  ·  Problem: Carotid stenosis, right.  Plan: Hypotension and bradycardic after procedure monitor on telemetry overnight, IV fluids overnight.   Aspirin 325mg qd , plavix 75mg qd      Problem/Plan - 2:  ·  Problem: Hyperlipidemia.  Plan: Atorvastatin 40mg po daily       Problem/Plan - 3:  ·  Problem: Hypertension.  Plan: hold antihypertensives for prevailing hypotension .     Dispo: For discharge home in the AM
77 yof s/p carotid stent still with bradycardia
78 y/o female with a history of HTN, hyperlipidemia, mitral valve disease, and bilateral carotid stenosis s/p placement of right internal carotid artery stent after a recent carotid duplex showed 90% occlusion of the R ICA. Procedure was performed without complications, however patient developed post-op shock requiring vasopressor support with Phenylephrine infusion.    PLAN:  - Likely related to stunning of baroreceptors during intervention  - Start on oral vasoactive agents Midodrine 10mg PO q8h  - Hold antihypertensive regimen  - Continue to wean off vasoactive agents as tolerated, maintain MAP > 65  - Maalox PRN dyspepsia, Protonix  - Aspirin, Plavix, statin
A- s/p carotid angiogram with stent to DAVID 80% lesion RFA

## 2018-04-27 NOTE — PROGRESS NOTE ADULT - SUBJECTIVE AND OBJECTIVE BOX
CC: Right ICA stenting for 90% occlusion.  No acute symptoms.  Patient said she would like to walk around.   HPI:    REVIEW OF SYSTEMS:    Patient denied fever, chills, abdominal pain, nausea, vomiting, cough, shortness of breath, chest pain or palpitations    Vital Signs Last 24 Hrs  T(C): 36.5 (27 Apr 2018 16:00), Max: 37.3 (26 Apr 2018 23:57)  T(F): 97.7 (27 Apr 2018 16:00), Max: 99.2 (26 Apr 2018 23:57)  HR: 43 (27 Apr 2018 16:30) (38 - 88)  BP: 90/50 (27 Apr 2018 16:30) (77/46 - 169/72)  BP(mean): 68 (27 Apr 2018 16:30) (56 - 105)  RR: 15 (27 Apr 2018 16:30) (12 - 42)  SpO2: 100% (27 Apr 2018 16:30) (95% - 100%)I&O's Summary    26 Apr 2018 07:01  -  27 Apr 2018 07:00  --------------------------------------------------------  IN: 1036 mL / OUT: 400 mL / NET: 636 mL    27 Apr 2018 07:01  -  27 Apr 2018 17:31  --------------------------------------------------------  IN: 2500 mL / OUT: 0 mL / NET: 2500 mL      PHYSICAL EXAM:  GENERAL: NAD, well-groomed  HEENT: PERRL, +EOMI, anicteric, no [place]  NECK: Supple, No JVD   CHEST/LUNG: CTA bilaterally; Normal effort  HEART: S1S2 Normal intensity, no murmurs, gallops or rubs noted  ABDOMEN: Soft, BS Normoactive, NT, ND, no HSM noted  EXTREMITIES:  2+ radial and DP pulses noted, no clubbing, cyanosis, or edema noted, FROM x 4  SKIN: No rashes or lesions noted  NEURO: A&Ox3, no focal deficits noted, CN II-XII intact  PSYCH: normal mood and affect; insight/judgement appropriate  LABS:                        12.1   10.3  )-----------( 220      ( 27 Apr 2018 05:50 )             37.8     04-27    140  |  106  |  19.0  ----------------------------<  102  4.3   |  20.0<L>  |  0.69    Ca    9.0      27 Apr 2018 05:50          RADIOLOGY & ADDITIONAL TESTS:    MEDICATIONS:  MEDICATIONS  (STANDING):  aspirin 325 milliGRAM(s) Oral daily  atorvastatin 80 milliGRAM(s) Oral at bedtime  clopidogrel Tablet 75 milliGRAM(s) Oral daily  multiple electrolytes Injection Type 1 1000 milliLiter(s) (125 mL/Hr) IV Continuous <Continuous>  pantoprazole    Tablet 40 milliGRAM(s) Oral before breakfast    MEDICATIONS  (PRN):  aluminum hydroxide/magnesium hydroxide/simethicone Suspension 30 milliLiter(s) Oral every 4 hours PRN Dyspepsia

## 2018-04-27 NOTE — PROGRESS NOTE ADULT - PROBLEM SELECTOR PLAN 1
P- INTERVENTIONAL RECOMMENDATIONS: Continue aspirin 325 mg daily / plavix 75  mg daily and statin  Hold antihypertensive medications for now  Encourgage oral hydration w/IVF and OOB  12 lead ECG in AM  Dr. Marley to manage the patient on 4T  Anticipate d/c in AM if hemodynamically stable  Outpatient follow-up with Dr Gracia @ [facility] in one week

## 2018-04-27 NOTE — PROGRESS NOTE ADULT - SUBJECTIVE AND OBJECTIVE BOX
Patient is a 77y old  Female who presents with a chief complaint of carotid stenosis (26 Apr 2018 16:02)      BRIEF HOSPITAL COURSE: 78 y/o female with a history of HTN, hyperlipidemia, mitral valve disease, and bilateral carotid stenosis s/p placement of right internal carotid artery stent after a recent carotid duplex showed 90% occlusion of the R ICA. Procedure was performed without complications, however patient developed post-op hypotension requiring vasopressor support with Phenylephrine infusion.    Events last 24 hours: Patient remained hypotensive overnight with increasing vasopressor requirements. AM dose of Midodrine to be given to assist with bridging off IV vasoactive agents per discussion with Cardiology NP. Also noted to be sinus bradycardic (as low as 40's while sleeping), which patient reports is not her baseline. Patient has remained asymptomatic, denies chest pain, shortness of breath, dizziness, headaches, visual changes, or weakness. States "I feel fine".     PAST MEDICAL & SURGICAL HISTORY:  Mitral valve disease  Hyperlipidemia  Peripheral vascular disease  Hypertension  No significant past surgical history      Review of Systems:  CONSTITUTIONAL: No fever, chills, or fatigue  EYES: No eye pain, visual disturbances, or discharge  ENMT:  No difficulty hearing, tinnitus, vertigo; No sinus or throat pain  NECK: No pain or stiffness  RESPIRATORY: No cough, wheezing, chills or hemoptysis; No shortness of breath  CARDIOVASCULAR: No chest pain, palpitations, dizziness, or leg swelling  GASTROINTESTINAL: No abdominal or epigastric pain. No nausea, vomiting, or hematemesis; No diarrhea or constipation. No melena or hematochezia.  GENITOURINARY: No dysuria, frequency, hematuria, or incontinence  NEUROLOGICAL: No headaches, memory loss, loss of strength, numbness, or tremors  SKIN: No itching, burning, rashes, or lesions   MUSCULOSKELETAL: No joint pain or swelling; No muscle, back, or extremity pain  PSYCHIATRIC: No depression, anxiety, mood swings, or difficulty sleeping      Medications:  midodrine 10 milliGRAM(s) Oral every 8 hours  phenylephrine    Infusion 0.588 MICROgram(s)/kG/Min IV Continuous <Continuous>  aspirin 325 milliGRAM(s) Oral daily  clopidogrel Tablet 75 milliGRAM(s) Oral daily  aluminum hydroxide/magnesium hydroxide/simethicone Suspension 30 milliLiter(s) Oral every 4 hours PRN  pantoprazole    Tablet 40 milliGRAM(s) Oral before breakfast  atorvastatin 80 milliGRAM(s) Oral at bedtime  sodium chloride 0.9%. 1000 milliLiter(s) IV Continuous <Continuous>  chlorhexidine 4% Liquid 1 Application(s) Topical once        ICU Vital Signs Last 24 Hrs  T(C): 37.3 (26 Apr 2018 23:57), Max: 37.3 (26 Apr 2018 23:57)  T(F): 99.2 (26 Apr 2018 23:57), Max: 99.2 (26 Apr 2018 23:57)  HR: 49 (26 Apr 2018 21:15) (43 - 80)  BP: 144/63 (26 Apr 2018 21:15) (79/45 - 184/76)  BP(mean): 90 (26 Apr 2018 21:15) (57 - 91)  ABP: --  ABP(mean): --  RR: 18 (26 Apr 2018 21:15) (12 - 29)  SpO2: 98% (26 Apr 2018 21:15) (97% - 100%)        I&O's Detail    26 Apr 2018 07:01  -  27 Apr 2018 05:39  --------------------------------------------------------  IN:    phenylephrine   Infusion: 75 mL    sodium chloride 0.9%.: 240 mL  Total IN: 315 mL    OUT:    Voided: 400 mL  Total OUT: 400 mL    Total NET: -85 mL            LABS:                CAPILLARY BLOOD GLUCOSE            CULTURES:      Physical Examination:    General: No acute distress.      HEENT: Pupils equal, reactive to light.  Symmetric.    PULM: Clear to auscultation bilaterally, no significant sputum production    CVS: Sinus bradycardia, no murmurs, rubs, or gallops    ABD: Soft, nondistended, nontender, normoactive bowel sounds, no masses    EXT: No edema, nontender    SKIN: Warm and well perfused, no rashes noted.    NEURO: Alert, oriented, interactive, nonfocal        CRITICAL CARE TIME SPENT: I have spent 40 minutes of critical care time evaluating and treating this patient, including reviewing charts, labs, imagining studies, and collaborating with interdisciplinary team.

## 2018-04-27 NOTE — PROGRESS NOTE ADULT - SUBJECTIVE AND OBJECTIVE BOX
Patient is a 77y old  Female who presents with a chief complaint of carotid stenosis (26 Apr 2018 16:02)      BRIEF HOSPITAL COURSE: 77 yof s/p carotid stent    Events last 24 hours: bradycardia, off phenephrine this am, tolerating IV fluids, hungry    PAST MEDICAL & SURGICAL HISTORY:  Mitral valve disease  Hyperlipidemia  Peripheral vascular disease  Hypertension  No significant past surgical history      Review of Systems:  CONSTITUTIONAL: No fever, chills, or fatigue  EYES: No eye pain, visual disturbances, or discharge  ENMT:  No difficulty hearing, tinnitus, vertigo; No sinus or throat pain  NECK: No pain or stiffness  RESPIRATORY: No cough, wheezing, chills or hemoptysis; No shortness of breath  CARDIOVASCULAR: No chest pain, palpitations, dizziness, or leg swelling  GASTROINTESTINAL: No abdominal or epigastric pain. No nausea, vomiting, or hematemesis; No diarrhea or constipation. No melena or hematochezia.  GENITOURINARY: No dysuria, frequency, hematuria, or incontinence  NEUROLOGICAL: No headaches, memory loss, loss of strength, numbness, or tremors  SKIN: No itching, burning, rashes, or lesions   MUSCULOSKELETAL: No joint pain or swelling; No muscle, back, or extremity pain  PSYCHIATRIC: No depression, anxiety, mood swings, or difficulty sleeping      Medications:    midodrine 10 milliGRAM(s) Oral every 8 hours      aspirin 325 milliGRAM(s) Oral daily      clopidogrel Tablet 75 milliGRAM(s) Oral daily    aluminum hydroxide/magnesium hydroxide/simethicone Suspension 30 milliLiter(s) Oral every 4 hours PRN  pantoprazole    Tablet 40 milliGRAM(s) Oral before breakfast      atorvastatin 80 milliGRAM(s) Oral at bedtime    multiple electrolytes Injection Type 1 1000 milliLiter(s) IV Continuous <Continuous>                ICU Vital Signs Last 24 Hrs  T(C): 36.5 (27 Apr 2018 16:00), Max: 37.3 (26 Apr 2018 23:57)  T(F): 97.7 (27 Apr 2018 16:00), Max: 99.2 (26 Apr 2018 23:57)  HR: 43 (27 Apr 2018 16:30) (38 - 88)  BP: 90/50 (27 Apr 2018 16:30) (77/46 - 169/72)  BP(mean): 68 (27 Apr 2018 16:30) (56 - 105)  ABP: --  ABP(mean): --  RR: 15 (27 Apr 2018 16:30) (12 - 42)  SpO2: 100% (27 Apr 2018 16:30) (95% - 100%)          I&O's Detail    26 Apr 2018 07:01  -  27 Apr 2018 07:00  --------------------------------------------------------  IN:    Oral Fluid: 440 mL    phenylephrine   Infusion: 356 mL    sodium chloride 0.9%: 240 mL  Total IN: 1036 mL    OUT:    Voided: 400 mL  Total OUT: 400 mL    Total NET: 636 mL      27 Apr 2018 07:01  -  27 Apr 2018 16:53  --------------------------------------------------------  IN:    multiple electrolytes Injection Type 1multiple electrolytes Injection Type 1: 2500 mL  Total IN: 2500 mL    OUT:  Total OUT: 0 mL    Total NET: 2500 mL            LABS:                        12.1   10.3  )-----------( 220      ( 27 Apr 2018 05:50 )             37.8     04-27    140  |  106  |  19.0  ----------------------------<  102  4.3   |  20.0<L>  |  0.69    Ca    9.0      27 Apr 2018 05:50            CAPILLARY BLOOD GLUCOSE            CULTURES:      Physical Examination:    General: No acute distress.  Alert, oriented, interactive, nonfocal    HEENT: Pupils equal, reactive to light.  Symmetric.    PULM: Clear to auscultation bilaterally, no significant sputum production    CVS: sinus bradycardia    ABD: Soft, nondistended, nontender, normoactive bowel sounds, no masses    EXT: No edema, nontender    SKIN: Warm and well perfused, no rashes noted.

## 2018-04-28 VITALS
DIASTOLIC BLOOD PRESSURE: 60 MMHG | TEMPERATURE: 98 F | SYSTOLIC BLOOD PRESSURE: 110 MMHG | HEART RATE: 75 BPM | OXYGEN SATURATION: 100 % | RESPIRATION RATE: 18 BRPM

## 2018-04-28 PROCEDURE — C1894: CPT

## 2018-04-28 PROCEDURE — 85027 COMPLETE CBC AUTOMATED: CPT

## 2018-04-28 PROCEDURE — 37215 TRANSCATH STENT CCA W/EPS: CPT

## 2018-04-28 PROCEDURE — C1876: CPT

## 2018-04-28 PROCEDURE — C1884: CPT

## 2018-04-28 PROCEDURE — 86901 BLOOD TYPING SEROLOGIC RH(D): CPT

## 2018-04-28 PROCEDURE — 99239 HOSP IP/OBS DSCHRG MGMT >30: CPT

## 2018-04-28 PROCEDURE — 93005 ELECTROCARDIOGRAM TRACING: CPT

## 2018-04-28 PROCEDURE — 36415 COLL VENOUS BLD VENIPUNCTURE: CPT

## 2018-04-28 PROCEDURE — 86850 RBC ANTIBODY SCREEN: CPT

## 2018-04-28 PROCEDURE — 80048 BASIC METABOLIC PNL TOTAL CA: CPT

## 2018-04-28 PROCEDURE — C1725: CPT

## 2018-04-28 PROCEDURE — C1769: CPT

## 2018-04-28 PROCEDURE — 36215 PLACE CATHETER IN ARTERY: CPT | Mod: XS

## 2018-04-28 PROCEDURE — 86900 BLOOD TYPING SEROLOGIC ABO: CPT

## 2018-04-28 PROCEDURE — C1887: CPT

## 2018-04-28 PROCEDURE — 93010 ELECTROCARDIOGRAM REPORT: CPT

## 2018-04-28 RX ORDER — OLMESARTAN MEDOXOMIL 5 MG/1
1 TABLET, FILM COATED ORAL
Qty: 0 | Refills: 0 | COMMUNITY

## 2018-04-28 RX ADMIN — Medication 325 MILLIGRAM(S): at 11:24

## 2018-04-28 RX ADMIN — PANTOPRAZOLE SODIUM 40 MILLIGRAM(S): 20 TABLET, DELAYED RELEASE ORAL at 05:26

## 2018-04-28 RX ADMIN — SODIUM CHLORIDE 125 MILLILITER(S): 9 INJECTION, SOLUTION INTRAVENOUS at 05:27

## 2018-04-28 RX ADMIN — CLOPIDOGREL BISULFATE 75 MILLIGRAM(S): 75 TABLET, FILM COATED ORAL at 11:24

## 2018-04-28 NOTE — PROGRESS NOTE ADULT - SUBJECTIVE AND OBJECTIVE BOX
[facility], [facility]                                          [address], [address], [place] [address]                                               PHONE: [phone]    FAX: [phone] [address], [address], [place] [address]                                       PHONE: [phone]    FAX: [phone]  *******************************************************************************    Overnight events/Subjective Assessment: no cp, palp, sob.  no dizziness    INTERPRETATION OF TELEMETRY (personally reviewed): SR 40s to 90s, intermittent BBB    penicillin (Hives)    MEDICATIONS  (STANDING):  aspirin 325 milliGRAM(s) Oral daily  atorvastatin 80 milliGRAM(s) Oral at bedtime  clopidogrel Tablet 75 milliGRAM(s) Oral daily  multiple electrolytes Injection Type 1 1000 milliLiter(s) (125 mL/Hr) IV Continuous <Continuous>  pantoprazole    Tablet 40 milliGRAM(s) Oral before breakfast    MEDICATIONS  (PRN):  aluminum hydroxide/magnesium hydroxide/simethicone Suspension 30 milliLiter(s) Oral every 4 hours PRN Dyspepsia      Vital Signs Last 24 Hrs  T(C): 36.6 (28 Apr 2018 10:18), Max: 36.6 (28 Apr 2018 10:18)  T(F): 97.9 (28 Apr 2018 10:18), Max: 97.9 (28 Apr 2018 10:18)  HR: 61 (28 Apr 2018 10:18) (38 - 85)  BP: 118/68 (28 Apr 2018 10:18) (78/46 - 123/68)  BP(mean): 73 (27 Apr 2018 22:53) (58 - 81)  RR: 18 (28 Apr 2018 10:18) (13 - 26)  SpO2: 100% (28 Apr 2018 10:18) (96% - 100%)    I&O's Detail    27 Apr 2018 07:01  -  28 Apr 2018 07:00  --------------------------------------------------------  IN:    multiple electrolytes Injection Type 1: 1625 mL    multiple electrolytes Injection Type 1multiple electrolytes Injection Type 1: 2500 mL    Oral Fluid: 120 mL  Total IN: 4245 mL    OUT:  Total OUT: 0 mL    Total NET: 4245 mL        I&O's Summary    27 Apr 2018 07:01  -  28 Apr 2018 07:00  --------------------------------------------------------  IN: 4245 mL / OUT: 0 mL / NET: 4245 mL            PHYSICAL EXAM:  General: Appears well developed, well nourished, no acute distress  HEENT: Head: normocephalic, atraumatic  Eyes: Pupils equal and reactive  Neck: Supple, no carotid bruit, no JVD, no HJR  CARDIOVASCULAR: Normal S1 and S2, 2/6 systolic murmur best at LUSB  LUNGS: Clear to auscultation bilaterally, no rales, rhonchi or wheeze  ABDOMEN: Soft, nontender, non-distended, positive bowel sounds, no mass or bruit  EXTREMITIES: No edema, distal pulses WNL.  right groin with mild ecchymosis, no hematoma + pulse, no bruit  SKIN: Warm and dry with normal turgor  NEURO: Alert & oriented x 3, grossly intact  PSYCH: normal mood and affect        LABS:                        12.1   10.3  )-----------( 220      ( 27 Apr 2018 05:50 )             37.8     04-27    140  |  106  |  19.0  ----------------------------<  102  4.3   |  20.0<L>  |  0.69    Ca    9.0      27 Apr 2018 05:50            serum  Lipids:         CARDIAC CATHETERIZATION: < from: Cardiac Cath Lab - Adult (04.26.18 @ 12:01) >  DIAGNOSTIC IMPRESSIONS: Severe (80%) ostial DAVID andmoderate (50%) ostial  LCIA stenoses  Successful self-expanding stent therapy of an 80% ostial DAVID stenosis  using distal embolic protection  All distal intracerebral branches were preserved post-intervention. There  were no apparent neurologic complications of the procedure.    < end of copied text >      ASSESSMENT AND PLAN:  In summary, YANI GUY is a 78 y/o female with a history of HTN, hyperlipidemia, mitral valve disease, and bilateral carotid stenosis s/p placement of right internal carotid artery stent due to critical asymptomatic 90% occlusion of the R ICA. No CP, SOB. PND or orthopnea.    - maintain ASA and plavix  - Continue atorvastatin  - BP and HR improved.  Continue to hold antihypertensives.  These will be added back in the outpatient setting.  - No longer on Momo or midodrine  - Outpt follow up with Dr Gracia this Tuesday arranged.  - Pt advised in symptoms of hypo and hypertension.  She will call the office/service if she experiences any of these.      Alberto Lin MD

## 2018-04-28 NOTE — PATIENT PROFILE ADULT. - NS PRO PT RIGHT SUPPORT PERSON
likely 2/2 infection, pt. 's mental status returning to baseline as per daughter with treatment of underlying medical conditions Declines

## 2021-07-08 NOTE — H&P PST ADULT - PRIMARY CARE PROVIDER
Additional Notes: Patient consent was obtained to proceed with the visit and recommended plan of care after discussion of all risks and benefits, including the risks of COVID-19 exposure.\\n
Render Risk Assessment In Note?: no
Detail Level: Simple
Dr. Stoner

## 2024-01-31 NOTE — PATIENT PROFILE ADULT. - PRESSURE ULCER(S)
I personally reviewed the [place] prescription drug monitoring program online database.  I refilled the prescription.        We are unable to do any paper scripts for controlled substances. I sent in script to pharmacy.    no

## 2025-04-29 NOTE — H&P PST ADULT - ENMT
P Quality Flow/Interdisciplinary Rounds Progress Note        Quality Flow Rounds held on April 29, 2025    Disciplines Attending:  Bedside Nurse, , , and Nursing Unit Leadership    Trace B Patris was admitted on 4/25/2025 10:56 PM    Anticipated Discharge Date:       Disposition:    Vidal Score:  Vidal Scale Score: 19    [facility] RISK OF UNPLANNED READMISSION 2.0             14.1 Total Score        Discussed patient goal for the day, patient clinical progression, and barriers to discharge.  The following Goal(s) of the Day/Commitment(s) have been identified:   have patient and family decide plan for future treatment      Kaylynn Galloway RN  April 29, 2025         not examined not applicable